# Patient Record
Sex: MALE | Race: WHITE | URBAN - METROPOLITAN AREA
[De-identification: names, ages, dates, MRNs, and addresses within clinical notes are randomized per-mention and may not be internally consistent; named-entity substitution may affect disease eponyms.]

---

## 2023-06-20 ENCOUNTER — OUTSIDE SERVICES (OUTPATIENT)
Dept: PRIMARY CARE CLINIC | Age: 48
End: 2023-06-20

## 2023-06-20 DIAGNOSIS — E78.2 MIXED HYPERLIPIDEMIA: ICD-10-CM

## 2023-06-20 DIAGNOSIS — G40.89 OTHER SEIZURES (HCC): ICD-10-CM

## 2023-06-20 DIAGNOSIS — I69.354 HEMIPARESIS AFFECTING LEFT SIDE AS LATE EFFECT OF CEREBROVASCULAR ACCIDENT (CVA) (HCC): ICD-10-CM

## 2023-06-20 DIAGNOSIS — I10 PRIMARY HYPERTENSION: ICD-10-CM

## 2023-06-20 DIAGNOSIS — I63.511 ACUTE ISCHEMIC CEREBROVASCULAR ACCIDENT (CVA) INVOLVING RIGHT MIDDLE CEREBRAL ARTERY TERRITORY (HCC): Primary | ICD-10-CM

## 2023-06-20 DIAGNOSIS — Z72.0 NICOTINE ABUSE: ICD-10-CM

## 2023-06-21 ASSESSMENT — ENCOUNTER SYMPTOMS
SINUS PRESSURE: 0
SHORTNESS OF BREATH: 0
SINUS PAIN: 0
SORE THROAT: 0
GASTROINTESTINAL NEGATIVE: 1
RHINORRHEA: 0
EYE DISCHARGE: 0
WHEEZING: 0
TROUBLE SWALLOWING: 0
VOICE CHANGE: 0
COUGH: 0
EYE REDNESS: 0
EYE ITCHING: 0

## 2023-06-21 ASSESSMENT — VISUAL ACUITY: OU: 1

## 2023-06-21 NOTE — PROGRESS NOTES
recurrent problem. The current episode started more than 1 year ago. The problem occurs intermittently. The problem has been unchanged. Pertinent negatives include no congestion, coughing, myalgias or sore throat. Nothing aggravates the symptoms. Treatments tried: taking medications, no medication side effects. The treatment provided mild relief. ROS:  Review of Systems   Constitutional: Negative. HENT:  Negative for congestion, ear discharge, ear pain, mouth sores, nosebleeds, postnasal drip, rhinorrhea, sinus pressure, sinus pain, sneezing, sore throat, trouble swallowing and voice change. Eyes:  Negative for discharge, redness, itching and visual disturbance. Denies diplopia, recent change in visual acuity, blurred vision, and night vision loss. Does not wear corrective lenses. Respiratory:  Negative for cough, shortness of breath and wheezing. Denies asthma, COPD, hemoptysis   Cardiovascular: Negative. Gastrointestinal: Negative. Endocrine: Negative. Genitourinary:  Negative for difficulty urinating and urgency. Denies hesitancy, incomplete voiding, and polyuria   Musculoskeletal:  Negative for myalgias. Denies joint pain   Skin: Negative. Neurological:  Positive for seizures. Hematological: Negative. Psychiatric/Behavioral:  Negative for confusion and suicidal ideas. The patient is not nervous/anxious. Denies difficulty concentrating, depression, flight of ideas, slow thought processes, suicide attempts      Current Meds: Refer to nursing home record    PMH:    Medical Problems: reviewed and updated   Htn, hyperlipidemia, hx of cva and seizure disorder    Surgical Hx: reviewed and updated   No past surgical history    FH: reviewed and updated   No family history on file.  Pt is non-contributory     SH: reviewed and updated    Lives alone current smoker on disability due to previous stroke         Objective:   BP: 127/83 Pulse: 90 Resp: 18 T: 97.6F